# Patient Record
Sex: FEMALE | Race: BLACK OR AFRICAN AMERICAN
[De-identification: names, ages, dates, MRNs, and addresses within clinical notes are randomized per-mention and may not be internally consistent; named-entity substitution may affect disease eponyms.]

---

## 2020-07-07 ENCOUNTER — HOSPITAL ENCOUNTER (EMERGENCY)
Dept: HOSPITAL 74 - FER | Age: 55
Discharge: HOME | End: 2020-07-07
Payer: COMMERCIAL

## 2020-07-07 VITALS — HEART RATE: 81 BPM | DIASTOLIC BLOOD PRESSURE: 92 MMHG | TEMPERATURE: 97.9 F | SYSTOLIC BLOOD PRESSURE: 131 MMHG

## 2020-07-07 VITALS — BODY MASS INDEX: 34.8 KG/M2

## 2020-07-07 DIAGNOSIS — R51: Primary | ICD-10-CM

## 2020-07-07 NOTE — PDOC
History of Present Illness





- General


Chief Complaint: Blood Pressure Problem


Stated Complaint: HIGH BLOOD PRESSURE


Time Seen by Provider: 07/07/20 16:30


History Source: Patient


Exam Limitations: No Limitations





- History of Present Illness


Initial Comments: 





Patient is a 54 year old female with history of HTN, IgA nephropathy, and renal 

disease presents to the ED with the complaint of elevated blood pressures over 

the week with associated headache. Denies headache currently. She reported a 

"popped blood vessel" in her left eye with subsequent blurry vision but does not

have blurry vision today. She reports palpitations and increased urinary 

frequency. Denies fevers, chest pain, SOB, syncope, lightheadedness, dizziness, 

numbness/tingling, weakness, excessive sweating or heat intolerance. 











07/07/20 17:10








Past History





- Medical History


Allergies/Adverse Reactions: 


                                    Allergies











Allergy/AdvReac Type Severity Reaction Status Date / Time


 


No Known Drug Allergies Allergy   Verified 07/07/20 16:23











Home Medications: 


Ambulatory Orders





Albuterol Sulfate Inhaler - [Ventolin Hfa Inhaler -] 1 - 2 inh PO QID PRN 

07/07/20 


Atorvastatin Ca [Lipitor] 20 mg PO DAILY 07/07/20 


Cetirizine HCl [Zyrtec -] 10 mg PO DAILY 07/07/20 


Enalapril/Hydrochlorothiazide [Vaseretic 10-25 mg Tablet] 1 each PO DAILY 

07/07/20 


Nebivolol HCl [Bystolic] 5 mg PO DAILY 07/07/20 











**Review of Systems





- Review of Systems


Able to Perform ROS?: Yes


Is the patient limited English proficient: No


Constitutional: No: Chills, Fever


HEENTM: No: Blurred Vision


Respiratory: No: Cough, Orthopnea, Shortness of Breath, SOB with Exertion


Cardiac (ROS): Yes: Palpitations.  No: Chest Pain, Edema, Lightheadedness, Synco

pe, Chest Tightness


ABD/GI: No: Constipated, Diarrhea, Nausea, Vomiting


: Yes: Hematuria.  No: Burning, Dysuria


Neurological: No: Headache, Numbness, Tingling, Weakness


Endocrine: No: Excessive Sweating, Intolerance to Heat





*Physical Exam





- Physical Exam


General Appearance: Yes: Nourished, Appropriately Dressed.  No: Apparent 

Distress


HEENT: positive: EOMI, SHANNA, Symmetrical


Neck: positive: Trachea midline


Respiratory/Chest: positive: Lungs Clear, Normal Breath Sounds.  negative: 

Respiratory Distress


Cardiovascular: positive: Regular Rhythm, Regular Rate, S1, S2


Gastrointestinal/Abdominal: positive: Soft.  negative: Tender, Guarding


Musculoskeletal: negative: CVA Tenderness


Integumentary: positive: Normal Color, Dry, Warm


Neurologic: positive: CNs II-XII NML intact, Fully Oriented, Alert, Normal 

Mood/Affect, Motor Strength 5/5.  negative: Sensory Deficit





Medical Decision Making





- Medical Decision Making





54 year old female with history of IgA nephropathy, HTN, and kidney disease 

presents with blood pressures with systolic 150-172 and diastolic 100s as well 

as headache and blurred vision 2 days ago which she attributes to scleral 

hemorrhage. She did not have focal neurological deficit on physical exam. 

Noncontrast head CT was done to rule out intracranial hemorrhage or ischemia, 

and was negative. Patient was advised to follow up outpatient with PCP for optim

al blood pressure control.











Discharge





- Discharge Information


Problems reviewed: Yes


Clinical Impression/Diagnosis: 


 Generalized headaches





Condition: Stable


Disposition: HOME





- Admission


No





- Follow up/Referral





- Patient Discharge Instructions


Patient Printed Discharge Instructions:  DI for High Blood Pressure, Malignant 

Hypertension


Additional Instructions: 


You came to the ED due to higher than normal blood pressures with associated 

headaches. Your head CT scan was normal. You should follow up with PCP to 

optimize blood pressure medications to get blood pressure under control. Return 

to the ED if your headaches become unbearable or vision changes continue.





- Post Discharge Activity

## 2020-07-07 NOTE — PDOC
Attending Attestation





- Resident


Resident Name: Jens Alexander





- HPI


HPI: 





07/11/20 17:38


Pt presents to the ED complaining of labile blood pressures and a three day 

history of HA that was acutely worse today.  Denies chest pain or shortness of 

breath.  Headache has improved, but is still persistent.  History of HTN that 

has been stable for many years.  





- Physicial Exam


PE: 





07/11/20 17:40


Agree with resident exam.  Patient is alert and oriented and in no acute 

distress.  HEENT: normocephalic, atruamatic.  Cv: rrr no m/r/g Pulm: CTA b/l  

Abdomen: soft, non tender, non distended no guarding or rebound.  Ext: no edema 

or tenderness. 





- Medical Decision Making





07/11/20 17:50


Pt presents to the ED complaining of labile blood pressures and headache for 

three days, worse this morning.  Headache was gradual onset and is now 

resolving, but given her history of severe HTN at home, will check CT to rule 

out intracranial bleed.  Will discharge if CT is negative. 





Discharge





- Discharge Information


Problems reviewed: Yes


Clinical Impression/Diagnosis: 


 Generalized headaches





Condition: Stable


Disposition: HOME





- Follow up/Referral





- Patient Discharge Instructions


Patient Printed Discharge Instructions:  Malignant Hypertension, DI for High 

Blood Pressure


Additional Instructions: 


You came to the ED due to higher than normal blood pressures with associated 

headaches. Your head CT scan was normal. You should follow up with PCP to 

optimize blood pressure medications to get blood pressure under control. Return 

to the ED if your headaches become unbearable or vision changes continue.





- Post Discharge Activity

## 2022-12-28 ENCOUNTER — HOSPITAL ENCOUNTER (EMERGENCY)
Dept: HOSPITAL 74 - JER | Age: 57
LOS: 1 days | Discharge: HOME | End: 2022-12-29
Payer: COMMERCIAL

## 2022-12-28 VITALS — BODY MASS INDEX: 35.8 KG/M2

## 2022-12-28 DIAGNOSIS — R10.30: ICD-10-CM

## 2022-12-28 DIAGNOSIS — E87.6: Primary | ICD-10-CM

## 2022-12-28 LAB
ALBUMIN SERPL-MCNC: 3.6 G/DL (ref 3.4–5)
ALP SERPL-CCNC: 94 U/L (ref 45–117)
ALT SERPL-CCNC: 25 U/L (ref 13–61)
ANION GAP SERPL CALC-SCNC: 10 MMOL/L (ref 8–16)
APPEARANCE UR: CLEAR
AST SERPL-CCNC: 19 U/L (ref 15–37)
BACTERIA # UR AUTO: 269 /UL (ref 0–1359)
BASOPHILS # BLD: 0.5 % (ref 0–2)
BILIRUB SERPL-MCNC: 0.6 MG/DL (ref 0.2–1)
BILIRUB UR STRIP.AUTO-MCNC: NEGATIVE MG/DL
BUN SERPL-MCNC: 21.7 MG/DL (ref 7–18)
CALCIUM SERPL-MCNC: 9.5 MG/DL (ref 8.5–10.1)
CASTS URNS QL MICRO: 1 /UL (ref 0–3.1)
CHLORIDE SERPL-SCNC: 104 MMOL/L (ref 98–107)
CO2 SERPL-SCNC: 27 MMOL/L (ref 21–32)
COLOR UR: YELLOW
CREAT SERPL-MCNC: 1.2 MG/DL (ref 0.55–1.3)
DEPRECATED RDW RBC AUTO: 14 % (ref 11.6–15.6)
EOSINOPHIL # BLD: 3.7 % (ref 0–4.5)
EPITH CASTS URNS QL MICRO: >36 /UL (ref 0–25.1)
GLUCOSE SERPL-MCNC: 155 MG/DL (ref 74–106)
HCT VFR BLD CALC: 40.3 % (ref 32.4–45.2)
HGB BLD-MCNC: 13.9 GM/DL (ref 10.7–15.3)
KETONES UR QL STRIP: (no result)
LEUKOCYTE ESTERASE UR QL STRIP.AUTO: NEGATIVE
LIPASE SERPL-CCNC: 378 U/L (ref 73–393)
LYMPHOCYTES # BLD: 35.7 % (ref 8–40)
MAGNESIUM SERPL-MCNC: 2 MG/DL (ref 1.8–2.4)
MCH RBC QN AUTO: 28.1 PG (ref 25.7–33.7)
MCHC RBC AUTO-ENTMCNC: 34.5 G/DL (ref 32–36)
MCV RBC: 81.6 FL (ref 80–96)
MONOCYTES # BLD AUTO: 6.7 % (ref 3.8–10.2)
NEUTROPHILS # BLD: 53.4 % (ref 42.8–82.8)
NITRITE UR QL STRIP: NEGATIVE
PH UR: 5 [PH] (ref 5–8)
PLATELET # BLD AUTO: 243 10^3/UL (ref 134–434)
PMV BLD: 7.4 FL (ref 7.5–11.1)
PROT SERPL-MCNC: 7.7 G/DL (ref 6.4–8.2)
PROT UR QL STRIP: (no result)
PROT UR QL STRIP: NEGATIVE
RBC # BLD AUTO: 28 /UL (ref 0–23.9)
RBC # BLD AUTO: 4.94 M/MM3 (ref 3.6–5.2)
SODIUM SERPL-SCNC: 141 MMOL/L (ref 136–145)
SP GR UR: 1.02 (ref 1.01–1.03)
UROBILINOGEN UR STRIP-MCNC: 0.2 MG/DL (ref 0.2–1)
WBC # BLD AUTO: 8.5 K/MM3 (ref 4–10)
WBC # UR AUTO: 19 /UL (ref 0–25.8)

## 2022-12-29 VITALS
RESPIRATION RATE: 16 BRPM | SYSTOLIC BLOOD PRESSURE: 122 MMHG | TEMPERATURE: 97.8 F | HEART RATE: 78 BPM | DIASTOLIC BLOOD PRESSURE: 83 MMHG

## 2022-12-29 LAB
ALBUMIN SERPL-MCNC: 3.2 G/DL (ref 3.4–5)
ALP SERPL-CCNC: 85 U/L (ref 45–117)
ALT SERPL-CCNC: 23 U/L (ref 13–61)
ANION GAP SERPL CALC-SCNC: 10 MMOL/L (ref 8–16)
AST SERPL-CCNC: 18 U/L (ref 15–37)
BILIRUB SERPL-MCNC: 0.5 MG/DL (ref 0.2–1)
BUN SERPL-MCNC: 21 MG/DL (ref 7–18)
CALCIUM SERPL-MCNC: 9 MG/DL (ref 8.5–10.1)
CHLORIDE SERPL-SCNC: 106 MMOL/L (ref 98–107)
CO2 SERPL-SCNC: 26 MMOL/L (ref 21–32)
CREAT SERPL-MCNC: 1.1 MG/DL (ref 0.55–1.3)
GLUCOSE SERPL-MCNC: 123 MG/DL (ref 74–106)
PROT SERPL-MCNC: 7 G/DL (ref 6.4–8.2)
SODIUM SERPL-SCNC: 142 MMOL/L (ref 136–145)